# Patient Record
Sex: FEMALE | Race: BLACK OR AFRICAN AMERICAN | NOT HISPANIC OR LATINO | Employment: OTHER | URBAN - METROPOLITAN AREA
[De-identification: names, ages, dates, MRNs, and addresses within clinical notes are randomized per-mention and may not be internally consistent; named-entity substitution may affect disease eponyms.]

---

## 2017-01-02 ENCOUNTER — HOSPITAL ENCOUNTER (EMERGENCY)
Facility: HOSPITAL | Age: 62
Discharge: HOME | End: 2017-01-02
Attending: EMERGENCY MEDICINE
Payer: OTHER GOVERNMENT

## 2017-01-02 VITALS
BODY MASS INDEX: 20.49 KG/M2 | HEIGHT: 64 IN | WEIGHT: 120 LBS | DIASTOLIC BLOOD PRESSURE: 63 MMHG | TEMPERATURE: 98 F | HEART RATE: 73 BPM | SYSTOLIC BLOOD PRESSURE: 141 MMHG | RESPIRATION RATE: 18 BRPM | OXYGEN SATURATION: 99 %

## 2017-01-02 DIAGNOSIS — M54.2 NECK PAIN, BILATERAL POSTERIOR: Primary | ICD-10-CM

## 2017-01-02 DIAGNOSIS — R73.9 HYPERGLYCEMIA: ICD-10-CM

## 2017-01-02 DIAGNOSIS — B37.2 CANDIDIASIS OF SKIN: ICD-10-CM

## 2017-01-02 LAB — POCT GLUCOSE: 307 MG/DL (ref 70–110)

## 2017-01-02 PROCEDURE — 96372 THER/PROPH/DIAG INJ SC/IM: CPT

## 2017-01-02 PROCEDURE — 99284 EMERGENCY DEPT VISIT MOD MDM: CPT | Mod: 25

## 2017-01-02 PROCEDURE — 63600175 PHARM REV CODE 636 W HCPCS: Performed by: EMERGENCY MEDICINE

## 2017-01-02 PROCEDURE — 25000003 PHARM REV CODE 250: Performed by: NURSE PRACTITIONER

## 2017-01-02 PROCEDURE — 82962 GLUCOSE BLOOD TEST: CPT

## 2017-01-02 RX ORDER — HYDROCODONE BITARTRATE AND ACETAMINOPHEN 5; 325 MG/1; MG/1
1 TABLET ORAL
Status: COMPLETED | OUTPATIENT
Start: 2017-01-02 | End: 2017-01-02

## 2017-01-02 RX ORDER — METOCLOPRAMIDE HYDROCHLORIDE 5 MG/ML
10 INJECTION INTRAMUSCULAR; INTRAVENOUS
Status: COMPLETED | OUTPATIENT
Start: 2017-01-02 | End: 2017-01-02

## 2017-01-02 RX ORDER — HYDROCODONE BITARTRATE AND ACETAMINOPHEN 5; 325 MG/1; MG/1
1 TABLET ORAL EVERY 6 HOURS PRN
Qty: 8 TABLET | Refills: 0 | Status: SHIPPED | OUTPATIENT
Start: 2017-01-02

## 2017-01-02 RX ORDER — DOXYLAMINE SUCCINATE 25 MG
TABLET ORAL 2 TIMES DAILY
Qty: 30 G | Refills: 0 | Status: SHIPPED | OUTPATIENT
Start: 2017-01-02

## 2017-01-02 RX ADMIN — HYDROCODONE BITARTRATE AND ACETAMINOPHEN 1 TABLET: 5; 325 TABLET ORAL at 04:01

## 2017-01-02 RX ADMIN — METOCLOPRAMIDE 10 MG: 5 INJECTION, SOLUTION INTRAMUSCULAR; INTRAVENOUS at 02:01

## 2017-01-02 NOTE — DISCHARGE INSTRUCTIONS
Please return to the ED for any new or worsening symptoms: chest pain, shortness of breath, loss of consciousness or any other concerns. Please follow up with primary care within in the week. You may also call [phone] for the [facility] same day appointment line.
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
## 2017-01-02 NOTE — ED AVS SNAPSHOT
[facility]  [place] [id number]               Mehnaz Prince   2017  2:42 PM   ED    Description:  Female : 1955   Department:  [facility]           Your Care was Coordinated By:     Provider Role From To    Rafal Hunter MD Attending Provider 17 1439 17 1439    Arnel Owen MD Attending Provider 17 [id number] --    Hilda Wharton NP Nurse Practitioner 17 [phone] --      Reason for Visit     Headache           Diagnoses this Visit        Comments    Neck pain, bilateral posterior    -  Primary     Candidiasis of skin         Hyperglycemia           ED Disposition     None           To Do List           Follow-up Information     Follow up with Your Primary Care Provider.    Why:  As needed, If symptoms worsen       These Medications        Disp Refills Start End    hydrocodone-acetaminophen 5-325mg (NORCO) 5-325 mg per tablet 8 tablet 0 2017     Take 1 tablet by mouth every 6 (six) hours as needed for Pain. Please do not drink alcohol, drive, operate heavy machinery, work or swim while taking this medication as it can cause mental impairment. - Oral    miconazole (MICOTIN) 2 % cream 30 g 0 2017     Apply topically 2 (two) times daily. - Topical (Top)      [facility]     [facility] -  Assistance  Registered nurses in the [facility] provide clinical advisement, health education, appointment booking, and other advisory services.  Call for this free service at [phone].             Medications           Message regarding Medications     Verify the changes and/or additions to your medication regime listed below are the same as discussed with your clinician today.  If any of these changes or additions are incorrect, please notify your healthcare provider.        START taking these NEW medications        Refills    hydrocodone-acetaminophen 5-325mg  (NORCO) 5-325 mg per tablet 0    Sig: Take 1 tablet by mouth every 6 (six) hours as needed for Pain. Please do not drink alcohol, drive, operate heavy machinery, work or swim while taking this medication as it can cause mental impairment.    Class: Print    Route: Oral    miconazole (MICOTIN) 2 % cream 0    Sig: Apply topically 2 (two) times daily.    Class: Print    Route: Topical (Top)      These medications were administered today        Dose Freq    metoclopramide HCl injection 10 mg 10 mg ED 1 Time    Sig: Inject 2 mLs (10 mg total) into the muscle ED 1 Time.    Class: Normal    Route: Intramuscular    hydrocodone-acetaminophen 5-325mg per tablet 1 tablet 1 tablet ED 1 Time    Sig: Take 1 tablet by mouth ED 1 Time.    Class: Normal    Route: Oral    Cosign for Ordering: Required by Arnel Owen MD           Verify that the below list of medications is an accurate representation of the medications you are currently taking.  If none reported, the list may be blank. If incorrect, please contact your healthcare provider. Carry this list with you in case of emergency.           Current Medications     fluticasone-salmeterol 100-50 mcg/dose (ADVAIR) 100-50 mcg/dose diskus inhaler Inhale 1 puff into the lungs 2 (two) times daily.    gabapentin (NEURONTIN) 300 mg tablet Take 600 mg by mouth 3 (three) times daily. Per Ayaan (pharmacist at [facility] in [place], [place])    glipiZIDE (GLUCOTROL) 10 MG tablet Take 10 mg by mouth 2 (two) times daily before meals. Per Jerardo (Pharmacist at [facility])    hydrOXYzine (ATARAX) 25 MG tablet Take 25-50 mg by mouth every evening. Per Jerardo (Pharmacist at [facility] [place] [facility])    insulin detemir (LEVEMIR) 100 unit/mL injection Inject 42 Units into the skin every evening. Per Ayaan (Pharmacist at [facility] in [facility])    insulin lispro (HUMALOG) 100 unit/mL injection Inject 10 Units into the skin 3 (three) times daily before meals. Per  "Ayaan (pharmacist at [facility] in [facility])    lovastatin (MEVACOR) 10 MG tablet Take 10 mg by mouth every evening. Per Ayaan (pharmacist at [facility] in [place], [place])    sertraline (ZOLOFT) 100 MG tablet Take 100 mg by mouth once daily. Per Ayaan (Pharmacist at [facility] in [facility])    azelastine-fluticasone 137-50 mcg/spray Spry 1 spray by Each Nare route 2 (two) times daily.    diazepam (VALIUM) 5 MG tablet Take 1 tablet (5 mg total) by mouth every 6 (six) hours as needed for Anxiety.    GABAPENTIN ORAL Take by mouth.    hydrocodone-acetaminophen 5-325mg (NORCO) 5-325 mg per tablet Take 1 tablet by mouth every 6 (six) hours as needed for Pain. Please do not drink alcohol, drive, operate heavy machinery, work or swim while taking this medication as it can cause mental impairment.    hydrocodone-acetaminophen 5-325mg per tablet 1 tablet Take 1 tablet by mouth ED 1 Time.    miconazole (MICOTIN) 2 % cream Apply topically 2 (two) times daily.    naproxen (NAPROSYN) 375 MG tablet Take 1 tablet (375 mg total) by mouth 2 (two) times daily as needed (pain).           Clinical Reference Information           Your Vitals Were     BP Pulse Temp Resp Height Weight    131/75 69 97.9 °F (36.6 °C) 20 5' 4" (1.626 m) 54.4 kg (120 lb)    SpO2 BMI             98% 20.6 kg/m2         Allergies as of 1/2/2017        Reactions    Latex, Natural Rubber     Obtained via records rec'd from Dr. Tomas.    Penicillins       Immunizations Administered on Date of Encounter - 1/2/2017     None      ED Micro, Lab, POCT     Start Ordered       Status Ordering Provider    01/02/17 1531 01/02/17 1531  POCT glucose  Once      Final result     01/02/17 1515 01/02/17 1514  POCT glucose  Once      Acknowledged       ED Imaging Orders     Start Ordered       Status Ordering Provider    01/02/17 1435 01/02/17 1435  CT Head Without Contrast  1 time imaging      Final result     01/02/17 1435 01/02/17 1435  CT Cervical Spine Without " Contrast  1 time imaging      Final result         Discharge Instructions       Please return to the ED for any new or worsening symptoms: chest pain, shortness of breath, loss of consciousness or any other concerns. Please follow up with primary care within in the week. You may also call [phone] for the [facility] same day appointment line.    Discharge References/Attachments     CANDIDA SKIN INFECTION (ADULT) (ENGLISH)    NECK PAIN (ENGLISH)      [facility] Sign-Up     Activating your [facility] account is as easy as 1-2-3!     1) Visit [email or web address], select Sign Up Now, enter this activation code and your date of birth, then select Next.  [email or web address]  Expires: 2/16/2017  4:34 PM      2) Create a username and password to use when you visit [facility] in the future and select a security question in case you lose your password and select Next.    3) Enter your e-mail address and click Sign Up!    Additional Information  If you have questions, please e-mail [email or web address] or call [phone] to talk to our [facility] staff. Remember, [facility] is NOT to be used for urgent needs. For medical emergencies, dial 911.          [facility] complies with applicable Federal civil rights laws and does not discriminate on the basis of race, color, national origin, age, disability, or sex.        Language Assistance Services     ATTENTION: Language assistance services are available, free of charge. Please call [phone].      ATENCIÓN: Si habla angelica, tiene a urias disposición servicios gratuitos de asistencia lingüística. Llame al [phone].     CHÚ Ý: N?u b?n nói Ti?ng Vi?t, có các d?ch v? h? tr? ngôn ng? mi?n phí dành cho b?n. G?i s? [phone].